# Patient Record
Sex: MALE | Race: WHITE | URBAN - METROPOLITAN AREA
[De-identification: names, ages, dates, MRNs, and addresses within clinical notes are randomized per-mention and may not be internally consistent; named-entity substitution may affect disease eponyms.]

---

## 2017-02-02 ENCOUNTER — APPOINTMENT (OUTPATIENT)
Dept: URBAN - METROPOLITAN AREA CLINIC 290 | Age: 47
Setting detail: DERMATOLOGY
End: 2017-02-02

## 2017-02-02 DIAGNOSIS — L72.8 OTHER FOLLICULAR CYSTS OF THE SKIN AND SUBCUTANEOUS TISSUE: ICD-10-CM

## 2017-02-02 PROCEDURE — OTHER PRESCRIPTION: OTHER

## 2017-02-02 PROCEDURE — OTHER TREATMENT REGIMEN: OTHER

## 2017-02-02 PROCEDURE — 99213 OFFICE O/P EST LOW 20 MIN: CPT

## 2017-02-02 PROCEDURE — OTHER COUNSELING: OTHER

## 2017-02-02 RX ORDER — MUPIROCIN 20 MG/G
OINTMENT TOPICAL
Qty: 22 | Refills: 0 | Status: ERX | COMMUNITY
Start: 2017-02-02

## 2017-02-02 RX ORDER — DOXYCYCLINE HYCLATE 100 MG/1
CAPSULE, GELATIN COATED ORAL
Qty: 14 | Refills: 0 | Status: ERX

## 2017-02-02 ASSESSMENT — LOCATION SIMPLE DESCRIPTION DERM: LOCATION SIMPLE: RIGHT CHEEK

## 2017-02-02 ASSESSMENT — LOCATION DETAILED DESCRIPTION DERM: LOCATION DETAILED: RIGHT MEDIAL MALAR CHEEK

## 2017-02-02 ASSESSMENT — LOCATION ZONE DERM: LOCATION ZONE: FACE

## 2017-02-02 NOTE — PROCEDURE: TREATMENT REGIMEN
Initiate Treatment: Doxycycline 100 mg: twice daily for two weeks \\nMupirocin 2% topical ointment: 2-3 times a day for two weeks
Detail Level: Zone

## 2017-04-10 ENCOUNTER — APPOINTMENT (OUTPATIENT)
Dept: URBAN - METROPOLITAN AREA CLINIC 290 | Age: 47
Setting detail: DERMATOLOGY
End: 2017-04-10

## 2017-04-10 DIAGNOSIS — Z41.9 ENCOUNTER FOR PROCEDURE FOR PURPOSES OTHER THAN REMEDYING HEALTH STATE, UNSPECIFIED: ICD-10-CM

## 2017-04-10 PROCEDURE — OTHER LUMENIS M22: OTHER

## 2017-04-10 ASSESSMENT — LOCATION ZONE DERM: LOCATION ZONE: FACE

## 2017-04-10 ASSESSMENT — LOCATION SIMPLE DESCRIPTION DERM: LOCATION SIMPLE: LEFT CHEEK

## 2017-04-10 ASSESSMENT — LOCATION DETAILED DESCRIPTION DERM: LOCATION DETAILED: LEFT CENTRAL MALAR CHEEK

## 2017-04-10 NOTE — PROCEDURE: LUMENIS M22
Anesthesia Volume In Cc: 0
Pulse Delay (In Milliseconds): 25
Consent: Written consent obtained, risks reviewed including but not limited to crusting, scabbing, blistering, scarring, darker or lighter pigmentary change, bruising, and/or incomplete response.
Total Pulses: 16
Treatment Number: 13
Treated Area: small area
Fluence Units: J/cm2
Post-Care Instructions: I reviewed with the patient in detail post-care instructions. Patient should stay away from the sun and wear sun protection until treated areas are fully healed.
Procedure Text: The patients skin was cleaned and the procedure was performed using the parameters noted above.
Detail Level: Zone
Number Of Passes: 3
Skin Type (Optional): II
Fluence: 14
Price (Use Numbers Only, No Special Characters Or $): 30.00

## 2017-06-30 ENCOUNTER — APPOINTMENT (OUTPATIENT)
Dept: URBAN - METROPOLITAN AREA CLINIC 290 | Age: 47
Setting detail: DERMATOLOGY
End: 2017-06-30

## 2017-06-30 DIAGNOSIS — Z41.9 ENCOUNTER FOR PROCEDURE FOR PURPOSES OTHER THAN REMEDYING HEALTH STATE, UNSPECIFIED: ICD-10-CM

## 2017-06-30 PROCEDURE — OTHER LUMENIS M22: OTHER

## 2017-06-30 ASSESSMENT — LOCATION DETAILED DESCRIPTION DERM: LOCATION DETAILED: LEFT CENTRAL MALAR CHEEK

## 2017-06-30 ASSESSMENT — LOCATION ZONE DERM: LOCATION ZONE: FACE

## 2017-06-30 ASSESSMENT — LOCATION SIMPLE DESCRIPTION DERM: LOCATION SIMPLE: LEFT CHEEK

## 2017-06-30 NOTE — PROCEDURE: LUMENIS M22
Treated Area: small area
Consent: Written consent obtained, risks reviewed including but not limited to crusting, scabbing, blistering, scarring, darker or lighter pigmentary change, bruising, and/or incomplete response.
Detail Level: Zone
Fluence: 14
Procedure Text: The patients skin was cleaned and the procedure was performed using the parameters noted above.
Fluence Units: J/cm2
Total Pulses: 21
Skin Type (Optional): III
Anesthesia Volume In Cc: 0
Post-Care Instructions: I reviewed with the patient in detail post-care instructions. Patient should stay away from the sun and wear sun protection until treated areas are fully healed.
Number Of Passes: 1
Pulse Duration (In Milliseconds): 3
Pulse Delay (In Milliseconds): 25
Price (Use Numbers Only, No Special Characters Or $): 30.00

## 2017-12-21 ENCOUNTER — APPOINTMENT (OUTPATIENT)
Dept: URBAN - METROPOLITAN AREA CLINIC 290 | Age: 47
Setting detail: DERMATOLOGY
End: 2018-01-01

## 2017-12-21 DIAGNOSIS — L72.8 OTHER FOLLICULAR CYSTS OF THE SKIN AND SUBCUTANEOUS TISSUE: ICD-10-CM

## 2017-12-21 DIAGNOSIS — L71.8 OTHER ROSACEA: ICD-10-CM

## 2017-12-21 PROCEDURE — OTHER PRESCRIPTION: OTHER

## 2017-12-21 PROCEDURE — OTHER INTRALESIONAL KENALOG: OTHER

## 2017-12-21 PROCEDURE — 11900 INJECT SKIN LESIONS </W 7: CPT

## 2017-12-21 PROCEDURE — OTHER MIPS QUALITY: OTHER

## 2017-12-21 PROCEDURE — 99212 OFFICE O/P EST SF 10 MIN: CPT | Mod: 25

## 2017-12-21 PROCEDURE — OTHER COUNSELING: OTHER

## 2017-12-21 RX ORDER — METRONIDAZOLE 10 MG/G
GEL TOPICAL
Qty: 60 | Refills: 1 | Status: ERX | COMMUNITY
Start: 2017-12-21

## 2017-12-21 RX ORDER — DOXYCYCLINE HYCLATE 20 MG/1
TABLET, FILM COATED ORAL
Qty: 60 | Refills: 3 | Status: ERX | COMMUNITY
Start: 2017-12-21

## 2017-12-21 ASSESSMENT — LOCATION SIMPLE DESCRIPTION DERM
LOCATION SIMPLE: LEFT CHEEK
LOCATION SIMPLE: RIGHT SUBMANDIBULAR AREA

## 2017-12-21 ASSESSMENT — LOCATION DETAILED DESCRIPTION DERM
LOCATION DETAILED: LEFT CENTRAL MALAR CHEEK
LOCATION DETAILED: RIGHT SUBMANDIBULAR AREA

## 2017-12-21 ASSESSMENT — LOCATION ZONE DERM: LOCATION ZONE: FACE

## 2017-12-21 NOTE — PROCEDURE: INTRALESIONAL KENALOG
Kenalog Preparation: Kenalog
Detail Level: Simple
X Size Of Lesion In Cm (Optional): 0
Consent: The risks of atrophy were reviewed with the patient. Verbal consent obtained.
Concentration Of Solution Injected (Mg/Ml): 5.0
Medical Necessity Clause: This procedure was medically necessary because the lesions that are tender and increasing in size
Total Volume Injected (Ccs- Only Use Numbers And Decimals): 0.2
Include Z78.9 (Other Specified Conditions Influencing Health Status) As An Associated Diagnosis?: No

## 2019-04-19 ENCOUNTER — APPOINTMENT (OUTPATIENT)
Dept: URBAN - METROPOLITAN AREA CLINIC 290 | Age: 49
Setting detail: DERMATOLOGY
End: 2019-04-19

## 2019-04-19 DIAGNOSIS — Z41.9 ENCOUNTER FOR PROCEDURE FOR PURPOSES OTHER THAN REMEDYING HEALTH STATE, UNSPECIFIED: ICD-10-CM

## 2019-04-19 PROCEDURE — OTHER LUMENIS M22: OTHER

## 2019-04-19 ASSESSMENT — LOCATION DETAILED DESCRIPTION DERM: LOCATION DETAILED: LEFT INFERIOR CENTRAL MALAR CHEEK

## 2019-04-19 ASSESSMENT — LOCATION ZONE DERM: LOCATION ZONE: FACE

## 2019-04-19 ASSESSMENT — LOCATION SIMPLE DESCRIPTION DERM: LOCATION SIMPLE: LEFT CHEEK

## 2019-04-19 NOTE — PROCEDURE: LUMENIS M22
Post-Care Instructions: I reviewed with the patient in detail post-care instructions. Patient should stay away from the sun and wear sun protection until treated areas are fully healed.
Treated Area: small area
Detail Level: Zone
Consent: Written consent obtained, risks reviewed including but not limited to crusting, scabbing, blistering, scarring, darker or lighter pigmentary change, bruising, and/or incomplete response.
Number Of Passes: 1
Price (Use Numbers Only, No Special Characters Or $): 30.00
Procedure Text: The patients skin was cleaned and the procedure was performed using the parameters noted above.
External Cooling Fan Speed: 0
Fluence: 15
Fluence Units: J/cm2
Pulse Delay (In Milliseconds): 20
Total Pulses: 26
Skin Type (Optional): II
Pulse Duration (In Milliseconds): 4

## 2019-08-07 ENCOUNTER — APPOINTMENT (OUTPATIENT)
Dept: URBAN - METROPOLITAN AREA CLINIC 290 | Age: 49
Setting detail: DERMATOLOGY
End: 2019-08-07

## 2019-08-07 DIAGNOSIS — L72.8 OTHER FOLLICULAR CYSTS OF THE SKIN AND SUBCUTANEOUS TISSUE: ICD-10-CM

## 2019-08-07 PROCEDURE — 99213 OFFICE O/P EST LOW 20 MIN: CPT | Mod: 25

## 2019-08-07 PROCEDURE — OTHER PRESCRIPTION: OTHER

## 2019-08-07 PROCEDURE — OTHER INTRALESIONAL KENALOG: OTHER

## 2019-08-07 PROCEDURE — 11900 INJECT SKIN LESIONS </W 7: CPT

## 2019-08-07 PROCEDURE — OTHER TREATMENT REGIMEN: OTHER

## 2019-08-07 PROCEDURE — OTHER COUNSELING: OTHER

## 2019-08-07 RX ORDER — DOXYCYCLINE 40 MG/1
CAPSULE ORAL
Qty: 30 | Refills: 0 | Status: ERX | COMMUNITY
Start: 2019-08-07

## 2019-08-07 ASSESSMENT — LOCATION DETAILED DESCRIPTION DERM: LOCATION DETAILED: RIGHT INFERIOR MEDIAL MALAR CHEEK

## 2019-08-07 ASSESSMENT — LOCATION ZONE DERM: LOCATION ZONE: FACE

## 2019-08-07 ASSESSMENT — LOCATION SIMPLE DESCRIPTION DERM: LOCATION SIMPLE: RIGHT CHEEK

## 2019-08-07 NOTE — PROCEDURE: INTRALESIONAL KENALOG
Medical Necessity Clause: This procedure was medically necessary because the lesions that were treated were:
Include Z78.9 (Other Specified Conditions Influencing Health Status) As An Associated Diagnosis?: No
Kenalog Preparation: Kenalog
Consent: Warned of scarring, reoccurrence, atrophy, and pigment changes. ICG by patient.  [id number].
Concentration Of Solution Injected (Mg/Ml): 5.0
X Size Of Lesion In Cm (Optional): 0
Total Volume Injected (Ccs- Only Use Numbers And Decimals): 0.2
Detail Level: Detailed
Administered By (Optional): Dimas Villa PA-C

## 2019-11-15 ENCOUNTER — APPOINTMENT (OUTPATIENT)
Dept: URBAN - METROPOLITAN AREA CLINIC 290 | Age: 49
Setting detail: DERMATOLOGY
End: 2019-11-15

## 2019-11-15 DIAGNOSIS — Z41.9 ENCOUNTER FOR PROCEDURE FOR PURPOSES OTHER THAN REMEDYING HEALTH STATE, UNSPECIFIED: ICD-10-CM

## 2019-11-15 PROCEDURE — OTHER LUMENIS M22: OTHER

## 2019-11-15 ASSESSMENT — LOCATION DETAILED DESCRIPTION DERM: LOCATION DETAILED: LEFT CENTRAL MALAR CHEEK

## 2019-11-15 ASSESSMENT — LOCATION SIMPLE DESCRIPTION DERM: LOCATION SIMPLE: LEFT CHEEK

## 2019-11-15 ASSESSMENT — LOCATION ZONE DERM: LOCATION ZONE: FACE

## 2019-11-15 NOTE — PROCEDURE: LUMENIS M22
Fluence: 15
Fluence Units: J/cm2
Procedure Text: The patients skin was cleaned and the procedure was performed using the parameters noted above.
Pulse Delay (In Milliseconds): 25
Total Pulses: 24
Treated Area: small area
Price (Use Numbers Only, No Special Characters Or $): 30.00
Anesthesia Volume In Cc: 0
Skin Type (Optional): III
Detail Level: Zone
Pulse Duration (In Milliseconds): 4
Treatment Number: 16
Consent: Written consent obtained, risks reviewed including but not limited to crusting, scabbing, blistering, scarring, darker or lighter pigmentary change, bruising, and/or incomplete response.
Post-Care Instructions: I reviewed with the patient in detail post-care instructions. Patient should stay away from the sun and wear sun protection until treated areas are fully healed.
Number Of Passes: 1

## 2020-03-06 ENCOUNTER — APPOINTMENT (OUTPATIENT)
Dept: URBAN - METROPOLITAN AREA CLINIC 290 | Age: 50
Setting detail: DERMATOLOGY
End: 2020-03-06

## 2020-03-06 DIAGNOSIS — Z41.9 ENCOUNTER FOR PROCEDURE FOR PURPOSES OTHER THAN REMEDYING HEALTH STATE, UNSPECIFIED: ICD-10-CM

## 2020-03-06 PROCEDURE — OTHER LUMENIS M22: OTHER

## 2020-03-06 ASSESSMENT — LOCATION SIMPLE DESCRIPTION DERM: LOCATION SIMPLE: LEFT CHEEK

## 2020-03-06 ASSESSMENT — LOCATION DETAILED DESCRIPTION DERM: LOCATION DETAILED: LEFT CENTRAL MALAR CHEEK

## 2020-03-06 ASSESSMENT — LOCATION ZONE DERM: LOCATION ZONE: FACE

## 2020-03-06 NOTE — PROCEDURE: LUMENIS M22
Anesthesia Volume In Cc: 0
Detail Level: Zone
Price (Use Numbers Only, No Special Characters Or $): 30.00
Total Pulses: 24
Fluence Units: J/cm2
Pulse Delay (In Milliseconds): 20
Consent: Written consent obtained, risks reviewed including but not limited to crusting, scabbing, blistering, scarring, darker or lighter pigmentary change, bruising, and/or incomplete response.
Treated Area: small area
Number Of Passes: 3
Skin Type (Optional): II
Depth: medium
Pulse Duration (In Milliseconds): 4
Procedure Text: The patients skin was cleaned and the procedure was performed using the parameters noted above.
Post-Care Instructions: I reviewed with the patient in detail post-care instructions. Patient should stay away from the sun and wear sun protection until treated areas are fully healed.
Treatment Number: 17
Fluence: 14

## 2022-02-25 ENCOUNTER — APPOINTMENT (OUTPATIENT)
Dept: URBAN - METROPOLITAN AREA CLINIC 231 | Age: 52
Setting detail: DERMATOLOGY
End: 2022-02-25

## 2022-02-25 DIAGNOSIS — Z41.9 ENCOUNTER FOR PROCEDURE FOR PURPOSES OTHER THAN REMEDYING HEALTH STATE, UNSPECIFIED: ICD-10-CM

## 2022-02-25 PROCEDURE — OTHER LUMENIS M22: OTHER

## 2022-02-25 ASSESSMENT — LOCATION SIMPLE DESCRIPTION DERM: LOCATION SIMPLE: LEFT CHEEK

## 2022-02-25 ASSESSMENT — LOCATION DETAILED DESCRIPTION DERM: LOCATION DETAILED: LEFT CENTRAL MALAR CHEEK

## 2022-02-25 ASSESSMENT — LOCATION ZONE DERM: LOCATION ZONE: FACE

## 2022-02-25 NOTE — PROCEDURE: LUMENIS M22
Consent: Written consent obtained, risks reviewed including but not limited to crusting, scabbing, blistering, scarring, darker or lighter pigmentary change, bruising, and/or incomplete response.
Detail Level: Zone
Fluence Units: J/cm2
Pulse Duration (In Milliseconds): 3.5
Fluence: 17
Pulse Delay (In Milliseconds): 25
Depth: medium
Total Pulses: 16
Price (Use Numbers Only, No Special Characters Or $): 30.00
External Cooling Fan Speed: 0
Post-Care Instructions: I reviewed with the patient in detail post-care instructions. Patient should stay away from the sun and wear sun protection until treated areas are fully healed.
Skin Type (Optional): II
Treatment Number: 18
Procedure Text: The patient's skin was cleaned and the procedure was performed using the parameters noted above.
Number Of Passes: 3
Anesthesia Type: 1% lidocaine with epinephrine
Treated Area: small area

## 2023-03-17 ENCOUNTER — APPOINTMENT (OUTPATIENT)
Dept: URBAN - METROPOLITAN AREA CLINIC 231 | Age: 53
Setting detail: DERMATOLOGY
End: 2023-03-17

## 2023-03-17 DIAGNOSIS — Z41.9 ENCOUNTER FOR PROCEDURE FOR PURPOSES OTHER THAN REMEDYING HEALTH STATE, UNSPECIFIED: ICD-10-CM

## 2023-03-17 PROBLEM — L29.8 OTHER PRURITUS: Status: ACTIVE | Noted: 2023-03-17

## 2023-03-17 PROBLEM — L85.3 XEROSIS CUTIS: Status: ACTIVE | Noted: 2023-03-17

## 2023-03-17 PROBLEM — L70.0 ACNE VULGARIS: Status: ACTIVE | Noted: 2023-03-17

## 2023-03-17 PROBLEM — L20.84 INTRINSIC (ALLERGIC) ECZEMA: Status: ACTIVE | Noted: 2023-03-17

## 2023-03-17 PROCEDURE — OTHER LUMENIS M22: OTHER

## 2023-03-17 ASSESSMENT — LOCATION SIMPLE DESCRIPTION DERM: LOCATION SIMPLE: LEFT CHEEK

## 2023-03-17 ASSESSMENT — LOCATION ZONE DERM: LOCATION ZONE: FACE

## 2023-03-17 ASSESSMENT — LOCATION DETAILED DESCRIPTION DERM: LOCATION DETAILED: LEFT CENTRAL MALAR CHEEK

## 2023-03-17 NOTE — PROCEDURE: LUMENIS M22
Pulse Delay (In Milliseconds): 20
Depth: medium
Consent: Written consent obtained, risks reviewed including but not limited to crusting, scabbing, blistering, scarring, darker or lighter pigmentary change, bruising, and/or incomplete response.
Price (Use Numbers Only, No Special Characters Or $): 30.00
Treated Area: medium area
Total Pulses: 84
Skin Type (Optional): II
Post-Care Instructions: I reviewed with the patient in detail post-care instructions. Patient should stay away from the sun and wear sun protection until treated areas are fully healed.
Pulse Duration (In Milliseconds): 4
Fluence Units: J/cm2
Anesthesia Type: 1% lidocaine with epinephrine
Detail Level: Zone
External Cooling Fan Speed: 0
Number Of Passes: 1
Procedure Text: The patient's skin was cleaned and the procedure was performed using the parameters noted above.
Fluence: 14

## 2023-06-02 ENCOUNTER — APPOINTMENT (OUTPATIENT)
Dept: URBAN - METROPOLITAN AREA CLINIC 231 | Age: 53
Setting detail: DERMATOLOGY
End: 2023-06-02

## 2023-06-02 DIAGNOSIS — Z41.9 ENCOUNTER FOR PROCEDURE FOR PURPOSES OTHER THAN REMEDYING HEALTH STATE, UNSPECIFIED: ICD-10-CM

## 2023-06-02 PROCEDURE — OTHER LUMENIS M22: OTHER

## 2023-06-02 ASSESSMENT — LOCATION ZONE DERM: LOCATION ZONE: FACE

## 2023-06-02 ASSESSMENT — LOCATION SIMPLE DESCRIPTION DERM: LOCATION SIMPLE: LEFT CHEEK

## 2023-06-02 ASSESSMENT — LOCATION DETAILED DESCRIPTION DERM: LOCATION DETAILED: LEFT MEDIAL MALAR CHEEK

## 2023-06-02 NOTE — PROCEDURE: LUMENIS M22
Skin Type (Optional): II
Depth: medium
Anesthesia Type: 1% lidocaine with epinephrine
Fluence Units: J/cm2
Pulse Duration (In Milliseconds): 3.5
Procedure Text: The patient's skin was cleaned and the procedure was performed using the parameters noted above.
Number Of Passes: 1
External Cooling Fan Speed: 0
Detail Level: Zone
Post-Care Instructions: I reviewed with the patient in detail post-care instructions. Patient should stay away from the sun and wear sun protection until treated areas are fully healed.
Consent: Written consent obtained, risks reviewed including but not limited to crusting, scabbing, blistering, scarring, darker or lighter pigmentary change, bruising, and/or incomplete response.
Treated Area: small area
Fluence: 16
Total Pulses: 108
Pulse Delay (In Milliseconds): 20

## 2023-11-17 ENCOUNTER — APPOINTMENT (OUTPATIENT)
Dept: URBAN - METROPOLITAN AREA CLINIC 231 | Age: 53
Setting detail: DERMATOLOGY
End: 2023-11-17

## 2023-11-17 DIAGNOSIS — Z41.9 ENCOUNTER FOR PROCEDURE FOR PURPOSES OTHER THAN REMEDYING HEALTH STATE, UNSPECIFIED: ICD-10-CM

## 2023-11-17 PROCEDURE — OTHER LUMENIS M22: OTHER

## 2023-11-17 ASSESSMENT — LOCATION DETAILED DESCRIPTION DERM: LOCATION DETAILED: NASAL DORSUM

## 2023-11-17 ASSESSMENT — LOCATION ZONE DERM: LOCATION ZONE: NOSE

## 2023-11-17 ASSESSMENT — LOCATION SIMPLE DESCRIPTION DERM: LOCATION SIMPLE: NOSE

## 2023-11-17 NOTE — PROCEDURE: LUMENIS M22
Fluence Units: J/cm2
Pulse Duration (In Milliseconds): 3.5
Post-Care Instructions: I reviewed with the patient in detail post-care instructions. Patient should stay away from the sun and wear sun protection until treated areas are fully healed.
Consent: Written consent obtained, risks reviewed including but not limited to crusting, scabbing, blistering, scarring, darker or lighter pigmentary change, bruising, and/or incomplete response.
Number Of Passes: 1
Skin Type (Optional): II
Depth: shallow
External Cooling Fan Speed: 0
Detail Level: Zone
Total Pulses: 38
Fluence: 16
Anesthesia Type: 1% lidocaine with epinephrine
Pulse Delay (In Milliseconds): 20
Procedure Text: The patient's skin was cleaned and the procedure was performed using the parameters noted above.
Treated Area: small area

## 2024-06-17 ENCOUNTER — APPOINTMENT (OUTPATIENT)
Dept: URBAN - METROPOLITAN AREA CLINIC 231 | Age: 54
Setting detail: DERMATOLOGY
End: 2024-06-17

## 2024-06-17 DIAGNOSIS — Z41.9 ENCOUNTER FOR PROCEDURE FOR PURPOSES OTHER THAN REMEDYING HEALTH STATE, UNSPECIFIED: ICD-10-CM

## 2024-06-17 PROCEDURE — OTHER LUMENIS M22: OTHER

## 2024-06-17 ASSESSMENT — LOCATION ZONE DERM: LOCATION ZONE: FACE

## 2024-06-17 ASSESSMENT — LOCATION DETAILED DESCRIPTION DERM: LOCATION DETAILED: LEFT MEDIAL MALAR CHEEK

## 2024-06-17 ASSESSMENT — LOCATION SIMPLE DESCRIPTION DERM: LOCATION SIMPLE: LEFT CHEEK

## 2024-06-17 NOTE — PROCEDURE: LUMENIS M22
Treated Area: small area
Fluence: 16
Consent: Written consent obtained, risks reviewed including but not limited to crusting, scabbing, blistering, scarring, darker or lighter pigmentary change, bruising, and/or incomplete response.
Skin Type (Optional): II
External Cooling Fan Speed: 0
Depth: shallow
Fluence Units: J/cm2
Procedure Text: The patient's skin was cleaned and the procedure was performed using the parameters noted above.
Pulse Duration (In Milliseconds): 3.5
Number Of Passes: 3
Anesthesia Type: 1% lidocaine with epinephrine
Detail Level: Zone
Total Pulses: 15
Pulse Delay (In Milliseconds): 20
Post-Care Instructions: I reviewed with the patient in detail post-care instructions. Patient should stay away from the sun and wear sun protection until treated areas are fully healed.

## 2024-11-08 ENCOUNTER — APPOINTMENT (OUTPATIENT)
Dept: URBAN - METROPOLITAN AREA CLINIC 231 | Age: 54
Setting detail: DERMATOLOGY
End: 2024-11-08

## 2024-11-08 DIAGNOSIS — Z41.9 ENCOUNTER FOR PROCEDURE FOR PURPOSES OTHER THAN REMEDYING HEALTH STATE, UNSPECIFIED: ICD-10-CM

## 2024-11-08 PROCEDURE — OTHER LUMENIS M22: OTHER

## 2024-11-08 ASSESSMENT — LOCATION DETAILED DESCRIPTION DERM: LOCATION DETAILED: LEFT MEDIAL MALAR CHEEK

## 2024-11-08 ASSESSMENT — LOCATION ZONE DERM: LOCATION ZONE: FACE

## 2024-11-08 ASSESSMENT — LOCATION SIMPLE DESCRIPTION DERM: LOCATION SIMPLE: LEFT CHEEK

## 2024-11-08 NOTE — PROCEDURE: LUMENIS M22
Procedure Text: The patient's skin was cleaned and the procedure was performed using the parameters noted above.
Anesthesia Type: 1% lidocaine with epinephrine
Fluence: 13
Detail Level: Zone
Fluence Units: J/cm2
Pulse Duration (In Milliseconds): 3.5
Depth: shallow
External Cooling Fan Speed: 0
Number Of Passes: 4
Skin Type (Optional): II
Post-Care Instructions: I reviewed with the patient in detail post-care instructions. Patient should stay away from the sun and wear sun protection until treated areas are fully healed.
Consent: Written consent obtained, risks reviewed including but not limited to crusting, scabbing, blistering, scarring, darker or lighter pigmentary change, bruising, and/or incomplete response.
Total Pulses: 12
Treated Area: small area
Pulse Delay (In Milliseconds): 15

## 2025-08-06 ENCOUNTER — APPOINTMENT (OUTPATIENT)
Dept: URBAN - METROPOLITAN AREA CLINIC 231 | Age: 55
Setting detail: DERMATOLOGY
End: 2025-08-06

## 2025-08-06 DIAGNOSIS — Z41.9 ENCOUNTER FOR PROCEDURE FOR PURPOSES OTHER THAN REMEDYING HEALTH STATE, UNSPECIFIED: ICD-10-CM

## 2025-08-06 PROCEDURE — OTHER LUMENIS M22: OTHER

## 2025-08-06 ASSESSMENT — LOCATION SIMPLE DESCRIPTION DERM: LOCATION SIMPLE: LEFT CHEEK

## 2025-08-06 ASSESSMENT — LOCATION DETAILED DESCRIPTION DERM: LOCATION DETAILED: LEFT CENTRAL MALAR CHEEK

## 2025-08-06 ASSESSMENT — LOCATION ZONE DERM: LOCATION ZONE: FACE
